# Patient Record
Sex: FEMALE | Race: WHITE | NOT HISPANIC OR LATINO | URBAN - METROPOLITAN AREA
[De-identification: names, ages, dates, MRNs, and addresses within clinical notes are randomized per-mention and may not be internally consistent; named-entity substitution may affect disease eponyms.]

---

## 2018-01-05 ENCOUNTER — IMPORTED ENCOUNTER (OUTPATIENT)
Dept: URBAN - METROPOLITAN AREA CLINIC 50 | Facility: CLINIC | Age: 73
End: 2018-01-05

## 2018-02-01 ENCOUNTER — IMPORTED ENCOUNTER (OUTPATIENT)
Dept: URBAN - METROPOLITAN AREA CLINIC 50 | Facility: CLINIC | Age: 73
End: 2018-02-01

## 2018-02-02 ENCOUNTER — IMPORTED ENCOUNTER (OUTPATIENT)
Dept: URBAN - METROPOLITAN AREA CLINIC 50 | Facility: CLINIC | Age: 73
End: 2018-02-02

## 2019-02-01 ENCOUNTER — IMPORTED ENCOUNTER (OUTPATIENT)
Dept: URBAN - METROPOLITAN AREA CLINIC 50 | Facility: CLINIC | Age: 74
End: 2019-02-01

## 2020-02-07 ENCOUNTER — IMPORTED ENCOUNTER (OUTPATIENT)
Dept: URBAN - METROPOLITAN AREA CLINIC 50 | Facility: CLINIC | Age: 75
End: 2020-02-07

## 2021-02-09 ENCOUNTER — IMPORTED ENCOUNTER (OUTPATIENT)
Dept: URBAN - METROPOLITAN AREA CLINIC 50 | Facility: CLINIC | Age: 76
End: 2021-02-09

## 2021-04-17 ASSESSMENT — VISUAL ACUITY
OS_CC: J1+
OD_CC: 20/25
OD_BAT: 20/25
OD_CC: J1+
OS_CC: 20/25
OS_CC: 20/25
OD_OTHER: 20/40.
OS_OTHER: 20/25. 20/25.
OS_CC: 20/25
OS_BAT: 20/25
OS_OTHER: 20/30.
OD_OTHER: 20/25. 20/25.
OD_CC: 20/25
OS_CC: 20/25

## 2021-04-17 ASSESSMENT — TONOMETRY
OD_IOP_MMHG: 11
OD_IOP_MMHG: 15
OS_IOP_MMHG: 11
OD_IOP_MMHG: 13
OD_IOP_MMHG: 12
OS_IOP_MMHG: 13
OS_IOP_MMHG: 15
OS_IOP_MMHG: 12

## 2021-06-29 NOTE — PROCEDURE NOTE: CLINICAL
PROCEDURE NOTE: Lucentis 0.5mg PFS #1 OS. Diagnosis: Neovascular AMD with Active CNV. Anesthesia: Topical. Prep: Betadine Flush. Prior to injection, risks/benefits/alternatives discussed including but not limited to infection, loss of vision or eye, hemorrhage, cataract, glaucoma, retinal tears or detachment. The patient wished to proceed with treatment. Topical anesthesia was induced with Alcaine. Additional anesthesia was achieved using drop(s) or injection checked above. A drop of Povidone-iodine 5% ophthalmic solution was instilled over the injection site and in the inferior fornix. Betadine prep was performed. A single use prefilled syringe of intravitreal Lucentis 0.5mg/0.05ml was used and excess was disposed of as waste. The needle was passed 3.0 mm posterior to the limbus in pseudophakic patients, and 3.5 mm posterior to the limbus in phakic patients. Injection Time 312. Patient tolerated the procedure well. There were no complications. The eye was irrigated with sterile irrigating solution. Post procedure instructions given. The patient was instructed to use Artificial Tears q.i.d. p.r.n for comfort. The patient was instructed to return for re-evaluation in approximately 4-12 weeks depending on his/her condition and was told to call immediately if vision decreases and/or if his/her eye becomes red, painful, and/or light sensitive. The patient was instructed to go to the emergency room or call 911 if unable to reach the doctor within an hour or two of trying or calling. Beba Garcia

## 2021-08-20 NOTE — PROCEDURE NOTE: CLINICAL
PROCEDURE NOTE: Lucentis 0.5mg PFS OS. Diagnosis: Neovascular AMD with Active CNV. Anesthesia: Lidocaine. Prep: Betadine Flush. Prior to injection, risks/benefits/alternatives discussed including but not limited to infection, loss of vision or eye, hemorrhage, cataract, glaucoma, retinal tears or detachment. The patient wished to proceed with treatment. Topical anesthesia was induced with Alcaine. Additional anesthesia was achieved using drop(s) or injection checked above. A drop of Povidone-iodine 5% ophthalmic solution was instilled over the injection site and in the inferior fornix. Betadine prep was performed. A single use prefilled syringe of intravitreal Lucentis 0.5mg/0.05ml was used and excess was disposed of as waste. The needle was passed 3.0 mm posterior to the limbus in pseudophakic patients, and 3.5 mm posterior to the limbus in phakic patients. Injection Time 1042AM. Patient tolerated the procedure well. There were no complications. The eye was irrigated with sterile irrigating solution. Post procedure instructions given. The patient was instructed to use Artificial Tears q.i.d. p.r.n for comfort. The patient was instructed to return for re-evaluation in approximately 4-12 weeks depending on his/her condition and was told to call immediately if vision decreases and/or if his/her eye becomes red, painful, and/or light sensitive. The patient was instructed to go to the emergency room or call 911 if unable to reach the doctor within an hour or two of trying or calling. Malcolm Nunez

## 2021-10-08 NOTE — PROCEDURE NOTE: CLINICAL
PROCEDURE NOTE: Lucentis 0.5mg PFS OS. Diagnosis: Neovascular AMD with Active CNV. Anesthesia: Lidocaine. Prep: Betadine Flush. Prior to injection, risks/benefits/alternatives discussed including but not limited to infection, loss of vision or eye, hemorrhage, cataract, glaucoma, retinal tears or detachment. The patient wished to proceed with treatment. Topical anesthesia was induced with Alcaine. Additional anesthesia was achieved using drop(s) or injection checked above. A drop of Povidone-iodine 5% ophthalmic solution was instilled over the injection site and in the inferior fornix. Betadine prep was performed. A single use prefilled syringe of intravitreal Lucentis 0.5mg/0.05ml was used and excess was disposed of as waste. The needle was passed 3.0 mm posterior to the limbus in pseudophakic patients, and 3.5 mm posterior to the limbus in phakic patients. Injection Time 2:11PM. Patient tolerated the procedure well. There were no complications. The eye was irrigated with sterile irrigating solution. Post procedure instructions given. The patient was instructed to use Artificial Tears q.i.d. p.r.n for comfort. The patient was instructed to return for re-evaluation in approximately 4-12 weeks depending on his/her condition and was told to call immediately if vision decreases and/or if his/her eye becomes red, painful, and/or light sensitive. The patient was instructed to go to the emergency room or call 911 if unable to reach the doctor within an hour or two of trying or calling. Malcolm Nunez

## 2021-11-30 NOTE — PROCEDURE NOTE: CLINICAL
PROCEDURE NOTE: Lucentis 0.5mg PFS OS. Diagnosis: Neovascular AMD with Active CNV. Anesthesia: Lidocaine. Prep: Betadine Flush. Prior to injection, risks/benefits/alternatives discussed including but not limited to infection, loss of vision or eye, hemorrhage, cataract, glaucoma, retinal tears or detachment. The patient wished to proceed with treatment. Topical anesthesia was induced with Alcaine. Additional anesthesia was achieved using drop(s) or injection checked above. A drop of Povidone-iodine 5% ophthalmic solution was instilled over the injection site and in the inferior fornix. Betadine prep was performed. A single use prefilled syringe of intravitreal Lucentis 0.5mg/0.05ml was used and excess was disposed of as waste. The needle was passed 3.0 mm posterior to the limbus in pseudophakic patients, and 3.5 mm posterior to the limbus in phakic patients. Injection Time 2:34. Patient tolerated the procedure well. There were no complications. The eye was irrigated with sterile irrigating solution. Post procedure instructions given. The patient was instructed to use Artificial Tears q.i.d. p.r.n for comfort. The patient was instructed to return for re-evaluation in approximately 4-12 weeks depending on his/her condition and was told to call immediately if vision decreases and/or if his/her eye becomes red, painful, and/or light sensitive. The patient was instructed to go to the emergency room or call 911 if unable to reach the doctor within an hour or two of trying or calling. Greta Girard

## 2022-02-03 ENCOUNTER — PREPPED CHART (OUTPATIENT)
Dept: URBAN - METROPOLITAN AREA CLINIC 53 | Facility: CLINIC | Age: 77
End: 2022-02-03

## 2022-02-07 ENCOUNTER — COMPREHENSIVE EXAM (OUTPATIENT)
Dept: URBAN - METROPOLITAN AREA CLINIC 53 | Facility: CLINIC | Age: 77
End: 2022-02-07

## 2022-02-07 DIAGNOSIS — H52.03: ICD-10-CM

## 2022-02-07 DIAGNOSIS — Z01.01: ICD-10-CM

## 2022-02-07 PROCEDURE — 92015 DETERMINE REFRACTIVE STATE: CPT

## 2022-02-07 PROCEDURE — 92014 COMPRE OPH EXAM EST PT 1/>: CPT

## 2022-02-07 ASSESSMENT — VISUAL ACUITY
OD_CC: 20/25
OS_GLARE: 20/30
OS_CC: 20/25+2
OD_GLARE: 20/40
OD_GLARE: 20/30
OS_GLARE: 20/40
OU_CC: J1+-2

## 2022-02-07 ASSESSMENT — TONOMETRY
OD_IOP_MMHG: 16
OS_IOP_MMHG: 16

## 2024-01-22 ENCOUNTER — COMPREHENSIVE EXAM (OUTPATIENT)
Dept: URBAN - METROPOLITAN AREA CLINIC 53 | Facility: CLINIC | Age: 79
End: 2024-01-22

## 2024-01-22 DIAGNOSIS — H25.13: ICD-10-CM

## 2024-01-22 DIAGNOSIS — H52.203: ICD-10-CM

## 2024-01-22 DIAGNOSIS — E11.9: ICD-10-CM

## 2024-01-22 PROCEDURE — 92014 COMPRE OPH EXAM EST PT 1/>: CPT

## 2024-01-22 PROCEDURE — 92015 DETERMINE REFRACTIVE STATE: CPT

## 2024-01-22 ASSESSMENT — VISUAL ACUITY
OS_GLARE: 20/25
OD_CC: 20/30
OS_GLARE: 20/25
OD_GLARE: 20/25
OS_CC: 20/25
OD_PH: 20/25
OU_CC: J1+@16
OD_GLARE: 20/25

## 2024-01-22 ASSESSMENT — TONOMETRY
OS_IOP_MMHG: 13
OD_IOP_MMHG: 13

## 2024-01-22 ASSESSMENT — KERATOMETRY
OD_AXISANGLE_DEGREES: 49
OS_AXISANGLE_DEGREES: 103
OS_K1POWER_DIOPTERS: 40.75
OD_K1POWER_DIOPTERS: 40.75
OD_K2POWER_DIOPTERS: 41.00
OS_AXISANGLE2_DEGREES: 13
OS_K2POWER_DIOPTERS: 41.25
OD_AXISANGLE2_DEGREES: 139

## 2025-01-27 ENCOUNTER — CONSULTATION/EVALUATION (OUTPATIENT)
Age: 80
End: 2025-01-27

## 2025-01-27 DIAGNOSIS — E11.9: ICD-10-CM

## 2025-01-27 DIAGNOSIS — H04.123: ICD-10-CM

## 2025-01-27 DIAGNOSIS — H25.13: ICD-10-CM

## 2025-01-27 DIAGNOSIS — H43.813: ICD-10-CM

## 2025-01-27 PROCEDURE — 99214 OFFICE O/P EST MOD 30 MIN: CPT

## 2025-02-10 ENCOUNTER — PRE-OP/H&P (OUTPATIENT)
Age: 80
End: 2025-02-10

## 2025-02-10 DIAGNOSIS — H43.813: ICD-10-CM

## 2025-02-10 DIAGNOSIS — H25.13: ICD-10-CM

## 2025-02-10 PROCEDURE — 92136 OPHTHALMIC BIOMETRY: CPT

## 2025-02-10 PROCEDURE — 92025-3 CORNEAL TOPO, REFUSED

## 2025-02-10 PROCEDURE — 92134 CPTRZ OPH DX IMG PST SGM RTA: CPT

## 2025-02-19 ENCOUNTER — POST-OP (OUTPATIENT)
Age: 80
End: 2025-02-19

## 2025-02-19 ENCOUNTER — SURGERY/PROCEDURE (OUTPATIENT)
Age: 80
End: 2025-02-19

## 2025-02-19 DIAGNOSIS — Z98.41: ICD-10-CM

## 2025-02-19 DIAGNOSIS — H25.811: ICD-10-CM

## 2025-02-19 PROCEDURE — 99199PCV CUSTOM VISION

## 2025-02-19 PROCEDURE — 66984CV REMOVE CATARACT, INSERT LENS, CUSTOM VISION

## 2025-02-24 ENCOUNTER — POST-OP (OUTPATIENT)
Age: 80
End: 2025-02-24

## 2025-02-24 DIAGNOSIS — H25.12: ICD-10-CM

## 2025-02-24 DIAGNOSIS — Z98.41: ICD-10-CM

## 2025-02-24 DIAGNOSIS — Z96.1: ICD-10-CM

## 2025-03-05 ENCOUNTER — POST-OP (OUTPATIENT)
Age: 80
End: 2025-03-05

## 2025-03-05 ENCOUNTER — SURGERY/PROCEDURE (OUTPATIENT)
Age: 80
End: 2025-03-05

## 2025-03-05 DIAGNOSIS — H25.12: ICD-10-CM

## 2025-03-05 DIAGNOSIS — Z98.42: ICD-10-CM

## 2025-03-05 DIAGNOSIS — Z96.1: ICD-10-CM

## 2025-03-05 PROCEDURE — 66984CV REMOVE CATARACT, INSERT LENS, CUSTOM VISION: Mod: 79,LT

## 2025-03-05 PROCEDURE — 99199PCV CUSTOM VISION

## 2025-03-20 ENCOUNTER — POST-OP (OUTPATIENT)
Age: 80
End: 2025-03-20

## 2025-03-20 DIAGNOSIS — Z96.1: ICD-10-CM

## 2025-03-20 DIAGNOSIS — Z98.42: ICD-10-CM

## 2025-04-07 ENCOUNTER — POST-OP (OUTPATIENT)
Age: 80
End: 2025-04-07

## 2025-04-07 DIAGNOSIS — Z98.41: ICD-10-CM

## 2025-04-07 DIAGNOSIS — H52.203: ICD-10-CM

## 2025-04-07 DIAGNOSIS — Z96.1: ICD-10-CM

## 2025-04-07 DIAGNOSIS — H52.03: ICD-10-CM

## 2025-04-07 DIAGNOSIS — H52.4: ICD-10-CM

## 2025-04-07 DIAGNOSIS — Z98.42: ICD-10-CM

## 2025-04-07 PROCEDURE — 99024 POSTOP FOLLOW-UP VISIT: CPT

## 2025-04-07 PROCEDURE — 92015 DETERMINE REFRACTIVE STATE: CPT
